# Patient Record
Sex: MALE | Race: WHITE | Employment: FULL TIME | URBAN - METROPOLITAN AREA
[De-identification: names, ages, dates, MRNs, and addresses within clinical notes are randomized per-mention and may not be internally consistent; named-entity substitution may affect disease eponyms.]

---

## 2018-12-04 ENCOUNTER — TELEPHONE (OUTPATIENT)
Dept: OTHER | Facility: CLINIC | Age: 33
End: 2018-12-04

## 2020-09-22 NOTE — PROGRESS NOTES
"Subjective     James Barakat is a 34 year old male who presents to clinic today for the following health issues:    HPI       Depression and Anxiety Follow-Up    How are you doing with your depression since your last visit? { :547389::\"No change\"}    How are you doing with your anxiety since your last visit?  { :780961::\"No change\"}    Are you having other symptoms that might be associated with depression or anxiety? { :954240}    Have you had a significant life event? { :762240}     Do you have any concerns with your use of alcohol or other drugs? { :052065}    Social History     Tobacco Use     Smoking status: Not on file   Substance Use Topics     Alcohol use: Not on file     Drug use: Not on file     No flowsheet data found.  No flowsheet data found.  {Last PHQ9 or GAD7 Responses (Optional):045169}    Suicide Assessment Five-step Evaluation and Treatment (SAFE-T)        {additonal problems for provider to add (Optional):233568}    Review of Systems   {ROS COMP (Optional):121725}      Objective    There were no vitals taken for this visit.  There is no height or weight on file to calculate BMI.  Physical Exam   {Exam List (Optional):239085}    {Diagnostic Test Results (Optional):644022}        {PROVIDER CHARTING PREFERENCE:947606}    "

## 2020-09-23 ENCOUNTER — OFFICE VISIT (OUTPATIENT)
Dept: FAMILY MEDICINE | Facility: CLINIC | Age: 35
End: 2020-09-23
Payer: COMMERCIAL

## 2020-09-23 ENCOUNTER — TELEPHONE (OUTPATIENT)
Dept: FAMILY MEDICINE | Facility: CLINIC | Age: 35
End: 2020-09-23

## 2020-09-23 VITALS
HEART RATE: 76 BPM | WEIGHT: 155 LBS | TEMPERATURE: 98.2 F | BODY MASS INDEX: 22.19 KG/M2 | HEIGHT: 70 IN | DIASTOLIC BLOOD PRESSURE: 71 MMHG | SYSTOLIC BLOOD PRESSURE: 109 MMHG

## 2020-09-23 DIAGNOSIS — F32.0 MILD MAJOR DEPRESSION (H): Primary | ICD-10-CM

## 2020-09-23 LAB
ALBUMIN SERPL-MCNC: 4.2 G/DL (ref 3.4–5)
ALP SERPL-CCNC: 53 U/L (ref 40–150)
ALT SERPL W P-5'-P-CCNC: 20 U/L (ref 0–70)
ANION GAP SERPL CALCULATED.3IONS-SCNC: 6 MMOL/L (ref 3–14)
AST SERPL W P-5'-P-CCNC: 17 U/L (ref 0–45)
BILIRUB SERPL-MCNC: 0.4 MG/DL (ref 0.2–1.3)
BUN SERPL-MCNC: 19 MG/DL (ref 7–30)
CALCIUM SERPL-MCNC: 8.9 MG/DL (ref 8.5–10.1)
CHLORIDE SERPL-SCNC: 106 MMOL/L (ref 94–109)
CO2 SERPL-SCNC: 29 MMOL/L (ref 20–32)
CREAT SERPL-MCNC: 1.02 MG/DL (ref 0.66–1.25)
DEPRECATED CALCIDIOL+CALCIFEROL SERPL-MC: 36 UG/L (ref 20–75)
ERYTHROCYTE [DISTWIDTH] IN BLOOD BY AUTOMATED COUNT: 12.3 % (ref 10–15)
GFR SERPL CREATININE-BSD FRML MDRD: >90 ML/MIN/{1.73_M2}
GLUCOSE SERPL-MCNC: 49 MG/DL (ref 70–99)
HCT VFR BLD AUTO: 42 % (ref 40–53)
HGB BLD-MCNC: 13.8 G/DL (ref 13.3–17.7)
MCH RBC QN AUTO: 30.3 PG (ref 26.5–33)
MCHC RBC AUTO-ENTMCNC: 32.9 G/DL (ref 31.5–36.5)
MCV RBC AUTO: 92 FL (ref 78–100)
PLATELET # BLD AUTO: 261 10E9/L (ref 150–450)
POTASSIUM SERPL-SCNC: 3.9 MMOL/L (ref 3.4–5.3)
PROT SERPL-MCNC: 7.3 G/DL (ref 6.8–8.8)
RBC # BLD AUTO: 4.56 10E12/L (ref 4.4–5.9)
SODIUM SERPL-SCNC: 141 MMOL/L (ref 133–144)
TSH SERPL DL<=0.005 MIU/L-ACNC: 1.84 MU/L (ref 0.4–4)
WBC # BLD AUTO: 4.1 10E9/L (ref 4–11)

## 2020-09-23 PROCEDURE — 36415 COLL VENOUS BLD VENIPUNCTURE: CPT | Performed by: FAMILY MEDICINE

## 2020-09-23 PROCEDURE — 84443 ASSAY THYROID STIM HORMONE: CPT | Performed by: FAMILY MEDICINE

## 2020-09-23 PROCEDURE — 99203 OFFICE O/P NEW LOW 30 MIN: CPT | Performed by: FAMILY MEDICINE

## 2020-09-23 PROCEDURE — 80053 COMPREHEN METABOLIC PANEL: CPT | Performed by: FAMILY MEDICINE

## 2020-09-23 PROCEDURE — 85027 COMPLETE CBC AUTOMATED: CPT | Performed by: FAMILY MEDICINE

## 2020-09-23 PROCEDURE — 82306 VITAMIN D 25 HYDROXY: CPT | Performed by: FAMILY MEDICINE

## 2020-09-23 RX ORDER — ESCITALOPRAM OXALATE 10 MG/1
TABLET ORAL
Qty: 30 TABLET | Refills: 1 | Status: SHIPPED | OUTPATIENT
Start: 2020-09-23 | End: 2020-10-27

## 2020-09-23 SDOH — HEALTH STABILITY: MENTAL HEALTH: HOW OFTEN DO YOU HAVE A DRINK CONTAINING ALCOHOL?: MONTHLY OR LESS

## 2020-09-23 ASSESSMENT — ANXIETY QUESTIONNAIRES
2. NOT BEING ABLE TO STOP OR CONTROL WORRYING: NOT AT ALL
GAD7 TOTAL SCORE: 2
1. FEELING NERVOUS, ANXIOUS, OR ON EDGE: SEVERAL DAYS
IF YOU CHECKED OFF ANY PROBLEMS ON THIS QUESTIONNAIRE, HOW DIFFICULT HAVE THESE PROBLEMS MADE IT FOR YOU TO DO YOUR WORK, TAKE CARE OF THINGS AT HOME, OR GET ALONG WITH OTHER PEOPLE: SOMEWHAT DIFFICULT
6. BECOMING EASILY ANNOYED OR IRRITABLE: SEVERAL DAYS
3. WORRYING TOO MUCH ABOUT DIFFERENT THINGS: NOT AT ALL
5. BEING SO RESTLESS THAT IT IS HARD TO SIT STILL: NOT AT ALL
7. FEELING AFRAID AS IF SOMETHING AWFUL MIGHT HAPPEN: NOT AT ALL

## 2020-09-23 ASSESSMENT — PATIENT HEALTH QUESTIONNAIRE - PHQ9
5. POOR APPETITE OR OVEREATING: NOT AT ALL
SUM OF ALL RESPONSES TO PHQ QUESTIONS 1-9: 7

## 2020-09-23 ASSESSMENT — MIFFLIN-ST. JEOR: SCORE: 1631.39

## 2020-09-23 NOTE — TELEPHONE ENCOUNTER
Had oatmeal with milk, brown sugar, and raisins. Had water to drink.    To provider to advise    Lina PEGUERON, RN, CPN

## 2020-09-23 NOTE — TELEPHONE ENCOUNTER
Please call pt.     All labs normal including kidney, liver , lytes, thyroid and hemoglobin     BS was low , I am assuming he did not eat this morning.     Maria De Jesus Reddy MD        Left message on answering machine for patient to call back.  [phone]  Lina PEGUERON, RN, CPN

## 2020-09-23 NOTE — PROGRESS NOTES
"Subjective     James Barakat is a 34 year old male who presents to clinic today for the following health issues:    HPI       Abnormal Mood Symptoms  Onset/Duration: worse this year  Description:   Depression (if yes, do PHQ-9): YES  Anxiety (if yes, do ENRICO-7): no  Accompanying Signs & Symptoms:  Still participating in activities that you used to enjoy: YES  Fatigue: YES  Irritability: YES  Difficulty concentrating: YES  Changes in appetite: no  Problems with sleep: YES  Heart racing/beating fast: no  Abnormally elevated, expansive, or irritable mood: YES  Persistently increased activity or energy: no  Thoughts of hurting yourself or others: no  History:  Recent stress or major life event: YES  Prior depression or anxiety: yes   Family history of depression or anxiety: no  Alcohol/drug use: no  Difficulty sleeping: YES  Precipitating or alleviating factors: None  Therapies tried and outcome: individual therapy  No flowsheet data found.  No flowsheet data found.    Has been going to therapy since February.    Has learned a lot.    Emotions have been on a roller coaster  Is  with 4 children  Sister with postpartum depression  Would like to try medication. Was opposed initially but reconsidered when therapist suggested to try it    Review of Systems   Constitutional, HEENT, cardiovascular, pulmonary, gi and gu systems are negative, except as otherwise noted.      Objective    /71   Pulse 76   Temp 98.2  F (36.8  C) (Oral)   Ht 1.765 m (5' 9.5\")   Wt 70.3 kg (155 lb)   BMI 22.56 kg/m    Body mass index is 22.56 kg/m .  Physical Exam   GENERAL: healthy, alert and no distress  RESP: lungs clear to auscultation - no rales, rhonchi or wheezes  CV: regular rate and rhythm, normal S1 S2, no S3 or S4, no murmur, click or rub, no peripheral edema and peripheral pulses strong    Results for orders placed or performed in visit on 09/23/20 (from the past 24 hour(s))   CBC with platelets   Result Value Ref Range    " WBC 4.1 4.0 - 11.0 10e9/L    RBC Count 4.56 4.4 - 5.9 10e12/L    Hemoglobin 13.8 13.3 - 17.7 g/dL    Hematocrit 42.0 40.0 - 53.0 %    MCV 92 78 - 100 fl    MCH 30.3 26.5 - 33.0 pg    MCHC 32.9 31.5 - 36.5 g/dL    RDW 12.3 10.0 - 15.0 %    Platelet Count 261 150 - 450 10e9/L           Assessment & Plan     Mild major depression (H)  Follow-up in one month  - escitalopram (LEXAPRO) 10 MG tablet; Take 1/2 tablet daily for 1-2 weeks, then increase to full tablet daily  - Comprehensive metabolic panel  - CBC with platelets  - TSH with free T4 reflex  - Vitamin D Deficiency     Depression Screening Follow Up    PHQ 9/23/2020   PHQ-9 Total Score 7   Q9: Thoughts of better off dead/self-harm past 2 weeks Several days     Last PHQ-9 9/23/2020   1.  Little interest or pleasure in doing things 1   2.  Feeling down, depressed, or hopeless 1   3.  Trouble falling or staying asleep, or sleeping too much 1   4.  Feeling tired or having little energy 1   5.  Poor appetite or overeating 0   6.  Feeling bad about yourself 1   7.  Trouble concentrating 1   8.  Moving slowly or restless 0   Q9: Thoughts of better off dead/self-harm past 2 weeks 1   PHQ-9 Total Score 7   Difficulty at work, home, or with people Very difficult               Follow Up    Follow Up Actions Taken      Discussed the following ways the patient can remain in a safe environment:            No follow-ups on file.    Maria De Jesus Reddy MD  [facility]

## 2020-09-24 ASSESSMENT — ANXIETY QUESTIONNAIRES: GAD7 TOTAL SCORE: 2

## 2020-09-24 NOTE — TELEPHONE ENCOUNTER
Results sent to pt via [employer]  Pt asymptomatic and not on any diabetic meds    Maria De Jesus Reddy MD

## 2020-10-22 NOTE — PROGRESS NOTES
"James Barakat is a 34 year old male who is being evaluated via a billable video visit.      The patient has been notified of following:     \"This video visit will be conducted via a call between you and your physician/provider. We have found that certain health care needs can be provided without the need for an in-person physical exam.  This service lets us provide the care you need with a video conversation.  If a prescription is necessary we can send it directly to your pharmacy.  If lab work is needed we can place an order for that and you can then stop by our lab to have the test done at a later time.    Video visits are billed at different rates depending on your insurance coverage.  Please reach out to your insurance provider with any questions.    If during the course of the call the physician/provider feels a video visit is not appropriate, you will not be charged for this service.\"    Patient has given verbal consent for Video visit? Yes  How would you like to obtain your AVS? MyChart  If you are dropped from the video visit, the video invite should be resent to: Text to cell phone: number in snapshot  Will anyone else be joining your video visit? No      Subjective     James Barakat is a 34 year old male who presents today via video visit for the following health issues:    HPI     Depression Followup    How are you doing with your depression since your last visit? Improved much better on lexapro. Feels like his normal self    Are you having other symptoms that might be associated with depression? No    Have you had a significant life event?  No     Are you feeling anxious or having panic attacks?   No    Do you have any concerns with your use of alcohol or other drugs? No    Social History     Tobacco Use     Smoking status: Never Smoker     Smokeless tobacco: Never Used   Substance Use Topics     Alcohol use: Yes     Frequency: Monthly or less     Drug use: Never     PHQ 9/23/2020   PHQ-9 Total Score 7 "   Q9: Thoughts of better off dead/self-harm past 2 weeks Several days     ENRICO-7 SCORE 9/23/2020   Total Score 2     Last PHQ-9 9/23/2020   1.  Little interest or pleasure in doing things 1   2.  Feeling down, depressed, or hopeless 1   3.  Trouble falling or staying asleep, or sleeping too much 1   4.  Feeling tired or having little energy 1   5.  Poor appetite or overeating 0   6.  Feeling bad about yourself 1   7.  Trouble concentrating 1   8.  Moving slowly or restless 0   Q9: Thoughts of better off dead/self-harm past 2 weeks 1   PHQ-9 Total Score 7   Difficulty at work, home, or with people Very difficult     ENRICO-7  9/23/2020   1. Feeling nervous, anxious, or on edge 1   2. Not being able to stop or control worrying 0   3. Worrying too much about different things 0   4. Trouble relaxing 0   5. Being so restless that it is hard to sit still 0   6. Becoming easily annoyed or irritable 1   7. Feeling afraid, as if something awful might happen 0   ENRICO-7 Total Score 2   If you checked any problems, how difficult have they made it for you to do your work, take care of things at home, or get along with other people? Somewhat difficult         Suicide Assessment Five-step Evaluation and Treatment (SAFE-T)      How many servings of fruits and vegetables do you eat daily?      On average, how many sweetened beverages do you drink each day (Examples: soda, juice, sweet tea, etc.  Do NOT count diet or artificially sweetened beverages)?       How many days per week do you exercise enough to make your heart beat faster?     How many minutes a day do you exercise enough to make your heart beat faster?     How many days per week do you miss taking your medication?          Video Start Time: 11:30    Pt on lexapro for depression  Is tolerating and doing well, feels like his normal self  No side effects from the medication  Is still seeing therapist and has since February.   Would like to continue this medication    Review of  Systems   Constitutional, HEENT, cardiovascular, pulmonary, gi and gu systems are negative, except as otherwise noted.      Objective           Vitals:  No vitals were obtained today due to virtual visit.    Physical Exam     GENERAL: Healthy, alert and no distress  EYES: Eyes grossly normal to inspection.  No discharge or erythema, or obvious scleral/conjunctival abnormalities.  RESP: No audible wheeze, cough, or visible cyanosis.  No visible retractions or increased work of breathing.    SKIN: Visible skin clear. No significant rash, abnormal pigmentation or lesions.  NEURO: Cranial nerves grossly intact.  Mentation and speech appropriate for age.  PSYCH: Mentation appears normal, affect normal/bright, judgement and insight intact, normal speech and appearance well-groomed.      No results found for this or any previous visit (from the past 24 hour(s)).        Assessment & Plan     Mild major depression (H)  Continue meds, follow-up with concerns  - escitalopram (LEXAPRO) 10 MG tablet; Take 1/2 tablet daily for 1-2 weeks, then increase to full tablet daily            No follow-ups on file.    Maria De Jesus Reddy MD  [facility]      Video-Visit Details    Type of service:  Video Visit    Video End Time:11:41 AM    Originating Location (pt. Location): Home    Distant Location (provider location):  [facility]     Platform used for Video Visit: [employer]

## 2020-10-27 ENCOUNTER — VIRTUAL VISIT (OUTPATIENT)
Dept: FAMILY MEDICINE | Facility: CLINIC | Age: 35
End: 2020-10-27
Payer: COMMERCIAL

## 2020-10-27 DIAGNOSIS — F32.0 MILD MAJOR DEPRESSION (H): ICD-10-CM

## 2020-10-27 PROCEDURE — 99213 OFFICE O/P EST LOW 20 MIN: CPT | Mod: 95 | Performed by: FAMILY MEDICINE

## 2020-10-27 RX ORDER — ESCITALOPRAM OXALATE 10 MG/1
TABLET ORAL
Qty: 90 TABLET | Refills: 3 | Status: SHIPPED | OUTPATIENT
Start: 2020-10-27 | End: 2021-11-29

## 2021-01-03 ENCOUNTER — HEALTH MAINTENANCE LETTER (OUTPATIENT)
Age: 36
End: 2021-01-03

## 2021-10-10 ENCOUNTER — HEALTH MAINTENANCE LETTER (OUTPATIENT)
Age: 36
End: 2021-10-10

## 2021-11-27 DIAGNOSIS — F32.0 MILD MAJOR DEPRESSION (H): ICD-10-CM

## 2021-11-29 RX ORDER — ESCITALOPRAM OXALATE 10 MG/1
10 TABLET ORAL DAILY
Qty: 30 TABLET | Refills: 3 | Status: SHIPPED | OUTPATIENT
Start: 2021-11-29

## 2022-01-29 ENCOUNTER — HEALTH MAINTENANCE LETTER (OUTPATIENT)
Age: 37
End: 2022-01-29

## 2022-09-18 ENCOUNTER — HEALTH MAINTENANCE LETTER (OUTPATIENT)
Age: 37
End: 2022-09-18

## 2023-05-07 ENCOUNTER — HEALTH MAINTENANCE LETTER (OUTPATIENT)
Age: 38
End: 2023-05-07